# Patient Record
Sex: MALE | URBAN - METROPOLITAN AREA
[De-identification: names, ages, dates, MRNs, and addresses within clinical notes are randomized per-mention and may not be internally consistent; named-entity substitution may affect disease eponyms.]

---

## 2020-08-10 ENCOUNTER — APPOINTMENT (OUTPATIENT)
Dept: URBAN - METROPOLITAN AREA CLINIC 259 | Age: 40
Setting detail: DERMATOLOGY
End: 2020-08-10

## 2020-08-10 DIAGNOSIS — B36.0 PITYRIASIS VERSICOLOR: ICD-10-CM

## 2020-08-10 DIAGNOSIS — D49.2 NEOPLASM OF UNSPECIFIED BEHAVIOR OF BONE, SOFT TISSUE, AND SKIN: ICD-10-CM

## 2020-08-10 DIAGNOSIS — R20.8 OTHER DISTURBANCES OF SKIN SENSATION: ICD-10-CM

## 2020-08-10 DIAGNOSIS — L30.9 DERMATITIS, UNSPECIFIED: ICD-10-CM

## 2020-08-10 PROCEDURE — 88305 TISSUE EXAM BY PATHOLOGIST: CPT

## 2020-08-10 PROCEDURE — OTHER PATHOLOGY BILLING: OTHER

## 2020-08-10 PROCEDURE — OTHER SHAVE REMOVAL: OTHER

## 2020-08-10 PROCEDURE — OTHER COUNSELING: OTHER

## 2020-08-10 PROCEDURE — 11311 SHAVE SKIN LESION 0.6-1.0 CM: CPT

## 2020-08-10 PROCEDURE — 99203 OFFICE O/P NEW LOW 30 MIN: CPT | Mod: 25

## 2020-08-10 PROCEDURE — OTHER PRESCRIPTION: OTHER

## 2020-08-10 RX ORDER — KETOCONAZOLE 20 MG/G
CREAM TOPICAL
Qty: 1 | Refills: 2 | Status: ERX | COMMUNITY
Start: 2020-08-10

## 2020-08-10 RX ORDER — HYDROCORTISONE 25 MG/G
CREAM TOPICAL
Qty: 2 | Refills: 3 | Status: ERX | COMMUNITY
Start: 2020-08-10

## 2020-08-10 RX ORDER — TRIAMCINOLONE ACETONIDE 1 MG/G
CREAM TOPICAL BID
Qty: 1 | Refills: 2 | Status: ERX | COMMUNITY
Start: 2020-08-10

## 2020-08-10 ASSESSMENT — LOCATION DETAILED DESCRIPTION DERM
LOCATION DETAILED: LEFT ULNAR DORSAL HAND
LOCATION DETAILED: RIGHT RADIAL DORSAL HAND
LOCATION DETAILED: RIGHT DORSAL FOOT
LOCATION DETAILED: RIGHT SUPERIOR LATERAL LOWER BACK
LOCATION DETAILED: LEFT RADIAL DORSAL HAND
LOCATION DETAILED: RIGHT SUPERIOR MEDIAL MIDBACK
LOCATION DETAILED: LEFT DORSAL FOOT
LOCATION DETAILED: LEFT INFERIOR LATERAL MALAR CHEEK
LOCATION DETAILED: LEFT SUPERIOR LATERAL LOWER BACK
LOCATION DETAILED: RIGHT RADIAL DORSAL HAND

## 2020-08-10 ASSESSMENT — LOCATION SIMPLE DESCRIPTION DERM
LOCATION SIMPLE: LEFT HAND
LOCATION SIMPLE: RIGHT FOOT
LOCATION SIMPLE: LEFT HAND
LOCATION SIMPLE: LEFT LOWER BACK
LOCATION SIMPLE: RIGHT HAND
LOCATION SIMPLE: LEFT FOOT
LOCATION SIMPLE: LEFT CHEEK
LOCATION SIMPLE: RIGHT HAND
LOCATION SIMPLE: RIGHT LOWER BACK

## 2020-08-10 ASSESSMENT — LOCATION ZONE DERM
LOCATION ZONE: HAND
LOCATION ZONE: HAND
LOCATION ZONE: FEET
LOCATION ZONE: TRUNK
LOCATION ZONE: FACE

## 2020-08-10 NOTE — PROCEDURE: COUNSELING
Detail Level: Detailed
Detail Level: Generalized
Detail Level: Zone
Patient Specific Counseling (Will Not Stick From Patient To Patient): See plan above.

## 2020-08-10 NOTE — PROCEDURE: SHAVE REMOVAL
Notification Instructions: Patient will be notified of biopsy results. However, patient instructed to call the office if not contacted within 2 weeks.
Billing Type: Third-Party Bill
Hemostasis: Aluminum Chloride
Consent was obtained from the patient. The risks and benefits to therapy were discussed in detail. Specifically, the risks of infection, scarring, bleeding, prolonged wound healing, incomplete removal, allergy to anesthesia, nerve injury and recurrence were addressed. Prior to the procedure, the treatment site was clearly identified and confirmed by the patient. All components of Universal Protocol/PAUSE Rule completed.
Medical Necessity Clause: This procedure was medically necessary because the lesion that was treated was:
Size Of Lesion In Cm (Required): 0.6
Detail Level: Detailed
X Size Of Lesion In Cm (Optional): 0
Bill For Surgical Tray: no
Was A Bandage Applied: Yes
Biopsy Method: Dermablade
Medical Necessity Information: It is in your best interest to select a reason for this procedure from the list below. All of these items fulfill various CMS LCD requirements except the new and changing color options.
Path Notes (To The Dermatopathologist): Please check margins.
Anesthesia Type: 1% lidocaine with epinephrine
Wound Care: Petrolatum
Post-Care Instructions: I reviewed with the patient in detail post-care instructions. Patient is to keep the biopsy site dry overnight, and then apply bacitracin twice daily until healed. Patient may apply hydrogen peroxide soaks to remove any crusting.

## 2020-08-10 NOTE — PROCEDURE: PATHOLOGY BILLING
Immunohistochemistry ([id number] and [id number]) billing is not performed here. Please use the Immunohistochemistry Stain Billing plan to accomplish this. Immunohistochemistry ([id number] and [id number]) billing is not performed here. Please use the Immunohistochemistry Stain Billing plan to accomplish this.

## 2020-08-10 NOTE — HPI: RASH
How Severe Is Your Rash?: moderate
Is This A New Presentation, Or A Follow-Up?: Rash
Additional History: Patient also has a mole on his face that he would like removed because it keeps getting nicked when shaving.